# Patient Record
Sex: FEMALE | Race: BLACK OR AFRICAN AMERICAN | Employment: FULL TIME | URBAN - METROPOLITAN AREA
[De-identification: names, ages, dates, MRNs, and addresses within clinical notes are randomized per-mention and may not be internally consistent; named-entity substitution may affect disease eponyms.]

---

## 2018-01-23 ENCOUNTER — APPOINTMENT (OUTPATIENT)
Dept: GENERAL RADIOLOGY | Age: 38
End: 2018-01-23
Attending: PHYSICIAN ASSISTANT
Payer: COMMERCIAL

## 2018-01-23 ENCOUNTER — HOSPITAL ENCOUNTER (EMERGENCY)
Age: 38
Discharge: HOME | End: 2018-01-23
Attending: INTERNAL MEDICINE
Payer: COMMERCIAL

## 2018-01-23 VITALS
WEIGHT: 293 LBS | TEMPERATURE: 98.4 F | SYSTOLIC BLOOD PRESSURE: 134 MMHG | BODY MASS INDEX: 50.02 KG/M2 | HEART RATE: 88 BPM | OXYGEN SATURATION: 100 % | DIASTOLIC BLOOD PRESSURE: 81 MMHG | HEIGHT: 64 IN | RESPIRATION RATE: 20 BRPM

## 2018-01-23 DIAGNOSIS — M77.9 BONE SPUR: ICD-10-CM

## 2018-01-23 DIAGNOSIS — M72.2 PLANTAR FASCIITIS: ICD-10-CM

## 2018-01-23 DIAGNOSIS — M79.671 RIGHT FOOT PAIN: Primary | ICD-10-CM

## 2018-01-23 PROCEDURE — 73630 X-RAY EXAM OF FOOT: CPT

## 2018-01-23 PROCEDURE — 99282 EMERGENCY DEPT VISIT SF MDM: CPT

## 2018-01-23 RX ORDER — IBUPROFEN 600 MG/1
600 TABLET ORAL
Qty: 20 TAB | Refills: 0 | Status: SHIPPED | OUTPATIENT
Start: 2018-01-23

## 2018-01-23 NOTE — ED TRIAGE NOTES
Patient with complaints of pain to the bottom of her right foot and ankle. Patient states that she has had the pain for 2 weeks.

## 2018-01-23 NOTE — ED PROVIDER NOTES
EMERGENCY DEPARTMENT HISTORY AND PHYSICAL EXAM    Date: 1/23/2018  Patient Name: Haim Ibarra    History of Presenting Illness     Chief Complaint   Patient presents with    Foot Pain         History Provided By: Patient    Chief Complaint: ankle pain  Duration: 2 Weeks  Location: right   Quality: Sharp  Modifying Factors: No medication taken for pain  Associated Symptoms: right heel pain    Additional History (Context):   6:39 PM  Haim Ibarra is a 30 y.o. female who presents to the emergency department C/O sharp right ankle pain onset 2 weeks ago. Associated symptoms include right heel pain. No medication taken for pain. Denies recent injury. 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 Pt denies fever any other Sx or complaints. PCP: None      Past History     Past Medical History:  History reviewed. No pertinent past medical history. Past Surgical History:  History reviewed. No pertinent surgical history. Family History:  History reviewed. No pertinent family history. Social History:  Social History   Substance Use Topics    Smoking status: Never Smoker    Smokeless tobacco: Never Used    Alcohol use No       Allergies:  No Known Allergies      Review of Systems   Review of Systems   Constitutional: Negative for fever. Respiratory: Negative for shortness of breath. Cardiovascular: Negative for chest pain. Musculoskeletal: Positive for arthralgias (right ankle pain) and myalgias (right heel pain). All other systems reviewed and are negative. Physical Exam     Vitals:    01/23/18 1814   BP: 134/81   Pulse: 88   Resp: 20   Temp: 98.4 °F (36.9 °C)   SpO2: 100%   Weight: 143.8 kg (317 lb)   Height: 5' 4\" (1.626 m)     Physical Exam   Constitutional: She is oriented to person, place, and time. Obese   Eyes: Conjunctivae and EOM are normal. Pupils are equal, round, and reactive to light. Right eye exhibits no discharge. Left eye exhibits no discharge. No scleral icterus. Neck: Normal range of motion. Neck supple. No JVD present. No tracheal deviation present. Cardiovascular: Normal rate, regular rhythm, normal heart sounds and intact distal pulses. Pulses:       Dorsalis pedis pulses are 2+ on the right side, and 2+ on the left side. Pulmonary/Chest: Effort normal and breath sounds normal.   Abdominal: Soft. Bowel sounds are normal. She exhibits no distension. There is no tenderness. Morbid obese. No HSM   Musculoskeletal: Normal range of motion. Tenderness in right medial malleolus and achilles area     No edema in bilateral LE    No deformity in right foot   Neurological: She is alert and oriented to person, place, and time. She has normal reflexes. No focal motor weakness. Skin: Skin is warm and dry. No rash noted. Psychiatric: She has a normal mood and affect. Her behavior is normal.   Nursing note and vitals reviewed. Diagnostic Study Results     Labs -   No results found for this or any previous visit (from the past 12 hour(s)). Radiologic Studies -   XR FOOT RT MIN 3 V   Final Result    IMPRESSION:     Degenerative changes the foot as described. No acute fractures or subluxation  As read by the radiologist.        7:30 PM  [id number] [facility] X-ray shows no fracture. Bone spur and DJD noted by radiologist   Pending review by Radiologist  Recorded by Junie Shane ED Scribe, as dictated by Juarez Nieves MD    CT Results  (Last 48 hours)    None        CXR Results  (Last 48 hours)    None          Medications given in the ED-  Medications - No data to display      Medical Decision Making   I am the first provider for this patient. I reviewed the vital signs, available nursing notes, past medical history, past surgical history, family history and social history. Vital Signs-Reviewed the patient's vital signs.     Pulse Oximetry Analysis - 100% on RA     Records Reviewed: Nursing Notes    Provider Notes (Medical Decision Making): DDX:fracture, dislocation, strain, sprain, plantar fasciitis. Bone spur     Procedures:  Procedures    ED Course:   6:39 PM Initial assessment performed. The patients presenting problems have been discussed, and they are in agreement with the care plan formulated and outlined with them. I have encouraged them to ask questions as they arise throughout their visit. Diagnosis and Disposition       DISCHARGE NOTE:  7:31 PM  Jessy Sykes's  results have been reviewed with her. She has been counseled regarding her diagnosis, treatment, and plan. She verbally conveys understanding and agreement of the signs, symptoms, diagnosis, treatment and prognosis and additionally agrees to follow up as discussed. She also agrees with the care-plan and conveys that all of her questions have been answered. I have also provided discharge instructions for her that include: educational information regarding their diagnosis and treatment, and list of reasons why they would want to return to the ED prior to their follow-up appointment, should her condition change. She has been provided with education for proper emergency department utilization. Weight bearing as tolerated. Discussed some exercises for stretches and strength to be done once pain better controlled, though not to overdo, may elevated leg above level of heart as much as possible. CLINICAL IMPRESSION:    1. Right foot pain    2. Plantar fasciitis    3. Bone spur        PLAN:  1. D/C Home  2. Current Discharge Medication List      START taking these medications    Details   ibuprofen (MOTRIN) 600 mg tablet Take 1 Tab by mouth every eight (8) hours as needed for Pain. Qty: 20 Tab, Refills: 0           3.    Follow-up Information     Follow up With Details Comments Contact Info    Mainor Fuller MD Schedule an appointment as soon as possible for a visit For follow up with podiatry  Michelle Wong [address] [place] [address] [address] [place] [address] EMERGENCY DEPT Go to As needed, as symptoms worsen [address] [place] [id number]  [phone]        _______________________________    Attestations: This note is prepared by Dacia eJnnings, acting as Scribe for Adrien Baumgarten, MD.    Adrien Baumgarten, MD:  The scribe's documentation has been prepared under my direction and personally reviewed by me in its entirety.   I confirm that the note above accurately reflects all work, treatment, procedures, and medical decision making performed by me.  _______________________________

## 2018-01-24 NOTE — DISCHARGE INSTRUCTIONS
Foot Pain: Care Instructions  Your Care Instructions  Foot injuries that cause pain and swelling are fairly common. Almost all sports or home repair projects can cause a misstep that ends up as foot pain. Normal wear and tear, especially as you get older, also can cause foot pain. Most minor foot injuries will heal on their own, and home treatment is usually all you need to do. If you have a severe injury, you may need tests and treatment. Follow-up care is a key part of your treatment and safety. Be sure to make and go to all appointments, and call your doctor if you are having problems. It's also a good idea to know your test results and keep a list of the medicines you take. How can you care for yourself at home? · Take pain medicines exactly as directed. ¨ If the doctor gave you a prescription medicine for pain, take it as prescribed. ¨ If you are not taking a prescription pain medicine, ask your doctor if you can take an over-the-counter medicine. · Rest and protect your foot. Take a break from any activity that may cause pain. · Put ice or a cold pack on your foot for 10 to 20 minutes at a time. Put a thin cloth between the ice and your skin. · Prop up the sore foot on a pillow when you ice it or anytime you sit or lie down during the next 3 days. Try to keep it above the level of your heart. This will help reduce swelling. · Your doctor may recommend that you wrap your foot with an elastic bandage. Keep your foot wrapped for as long as your doctor advises. · If your doctor recommends crutches, use them as directed. · Wear roomy footwear. · As soon as pain and swelling end, begin gentle exercises of your foot. Your doctor can tell you which exercises will help. When should you call for help? Call 911 anytime you think you may need emergency care. For example, call if:  ? · Your foot turns pale, white, blue, or cold.    ?Call your doctor now or seek immediate medical care if:  ? · You cannot move or stand on your foot. ? · Your foot looks twisted or out of its normal position. ? · Your foot is not stable when you step down. ? · You have signs of infection, such as:  ¨ Increased pain, swelling, warmth, or redness. ¨ Red streaks leading from the sore area. ¨ Pus draining from a place on your foot. ¨ A fever. ? · Your foot is numb or tingly. ? Watch closely for changes in your health, and be sure to contact your doctor if:  ? · You do not get better as expected. ? · You have bruises from an injury that last longer than 2 weeks. Where can you learn more? Go to http://[employer]in[employer] Tradingmoni.info/. Enter F274 in the search box to learn more about \"Foot Pain: Care Instructions. \"  Current as of: March 21, 2017  Content Version: 11.4  © 2049-4654 [employer]. Care instructions adapted under license by [employer] (which disclaims liability or warranty for this information). If you have questions about a medical condition or this instruction, always ask your healthcare professional. [address] any warranty or liability for your use of this information. Plantar Fasciitis: Care Instructions  Your Care Instructions    Plantar fasciitis is pain and inflammation of the plantar fascia, the tissue at the bottom of your foot that connects the heel bone to the toes. The plantar fascia also supports the arch. If you strain the plantar fascia, it can develop small tears and cause heel pain when you stand or walk. Plantar fasciitis can be caused by running or other sports. It also may occur in people who are overweight or who have high arches or flat feet. You may get plantar fasciitis if you walk or stand for long periods, or have a tight Achilles tendon or calf muscles. You can improve your foot pain with rest and other care at home. It might take a few weeks to a few months for your foot to heal completely.   Follow-up care is a key part of your treatment and safety. Be sure to make and go to all appointments, and call your doctor if you are having problems. It's also a good idea to know your test results and keep a list of the medicines you take. How can you care for yourself at home? · Rest your feet often. Reduce your activity to a level that lets you avoid pain. If possible, do not run or walk on hard surfaces. · Take pain medicines exactly as directed. ¨ If the doctor gave you a prescription medicine for pain, take it as prescribed. ¨ If you are not taking a prescription pain medicine, take an over-the-counter anti-inflammatory medicine for pain and swelling, such as ibuprofen (Advil, Motrin) or naproxen (Aleve). Read and follow all instructions on the label. · Use ice massage to help with pain and swelling. You can use an ice cube or an ice cup several times a day. To make an ice cup, fill a paper cup with water and freeze it. Cut off the top of the cup until a half-inch of ice shows. Hold onto the remaining paper to use the cup. Rub the ice in small circles over the area for 5 to 7 minutes. · Contrast baths, which alternate hot and cold water, can also help reduce swelling. But because heat alone may make pain and swelling worse, end a contrast bath with a soak in cold water. · Wear a night splint if your doctor suggests it. A night splint holds your foot with the toes pointed up and the foot and ankle at a 90-degree angle. This position gives the bottom of your foot a constant, gentle stretch. · Do simple exercises such as calf stretches and towel stretches 2 to 3 times each day, especially when you first get up in the morning. These can help the plantar fascia become more flexible. They also make the muscles that support your arch stronger. Hold these stretches for 15 to 30 seconds per stretch. Repeat 2 to 4 times. ¨ Stand about 1 foot from a wall. Place the palms of both hands against the wall at chest level.  Lean forward against the wall, keeping one leg with the knee straight and heel on the ground while bending the knee of the other leg. ¨ Sit down on the floor or a mat with your feet stretched in front of you. Roll up a towel lengthwise, and loop it over the ball of your foot. Holding the towel at both ends, gently pull the towel toward you to stretch your foot. · Wear shoes with good arch support. Athletic shoes or shoes with a well-cushioned sole are good choices. · Try heel cups or shoe inserts (orthotics) to help cushion your heel. You can buy these at many shoe stores. · Put on your shoes as soon as you get out of bed. Going barefoot or wearing slippers may make your pain worse. · Reach and stay at a good weight for your height. This puts less strain on your feet. When should you call for help? Call your doctor now or seek immediate medical care if:  · You have heel pain with fever, redness, or warmth in your heel. · You cannot put weight on the sore foot. Watch closely for changes in your health, and be sure to contact your doctor if:  · You have numbness or tingling in your heel. · Your heel pain lasts more than 2 weeks. Where can you learn more? Go to http://erin-moni.info/. Matty Ryder in the search box to learn more about \"Plantar Fasciitis: Care Instructions. \"  Current as of: March 21, 2017  Content Version: 11.4  © 6934-5282 [employer]. Care instructions adapted under license by [employer] (which disclaims liability or warranty for this information). If you have questions about a medical condition or this instruction, always ask your healthcare professional. [place] [address] any warranty or liability for your use of this information.

## 2018-01-24 NOTE — ED NOTES
See scanned documents for pt signing that she rec'd discharge instructions. Patient armband removed and shredded. Pt given script x1 with discharge instructions and verbalizes understanding. Pt discharged home ambulatory, no distress noted.

## 2022-01-01 ENCOUNTER — HOSPITAL ENCOUNTER (OUTPATIENT)
Dept: LAB | Age: 42
Discharge: HOME | End: 2022-01-01

## 2022-01-01 LAB — SENTARA SPECIMEN COL,SENBCF: NORMAL

## 2022-01-01 PROCEDURE — 99001 SPECIMEN HANDLING PT-LAB: CPT

## 2023-05-12 RX ORDER — IBUPROFEN 600 MG/1
TABLET ORAL EVERY 8 HOURS PRN
COMMUNITY
Start: 2018-01-23

## 2023-05-26 ENCOUNTER — HOSPITAL ENCOUNTER (EMERGENCY)
Facility: HOSPITAL | Age: 43
Discharge: HOME | End: 2023-05-26
Attending: EMERGENCY MEDICINE
Payer: COMMERCIAL

## 2023-05-26 VITALS
BODY MASS INDEX: 50.02 KG/M2 | SYSTOLIC BLOOD PRESSURE: 124 MMHG | OXYGEN SATURATION: 98 % | TEMPERATURE: 98 F | DIASTOLIC BLOOD PRESSURE: 75 MMHG | RESPIRATION RATE: 16 BRPM | WEIGHT: 293 LBS | HEART RATE: 98 BPM | HEIGHT: 64 IN

## 2023-05-26 DIAGNOSIS — J02.9 EXUDATIVE PHARYNGITIS: Primary | ICD-10-CM

## 2023-05-26 PROCEDURE — 6370000000 HC RX 637 (ALT 250 FOR IP): Performed by: EMERGENCY MEDICINE

## 2023-05-26 PROCEDURE — 99283 EMERGENCY DEPT VISIT LOW MDM: CPT

## 2023-05-26 RX ORDER — GUAIFENESIN/DEXTROMETHORPHAN 100-10MG/5
10 SYRUP ORAL 3 TIMES DAILY PRN
Qty: 120 ML | Refills: 0 | Status: SHIPPED | OUTPATIENT
Start: 2023-05-26 | End: 2023-06-05

## 2023-05-26 RX ORDER — PREDNISOLONE SODIUM PHOSPHATE 15 MG/5ML
60 SOLUTION ORAL DAILY
Qty: 80 ML | Refills: 0 | Status: SHIPPED | OUTPATIENT
Start: 2023-05-26 | End: 2023-05-30

## 2023-05-26 RX ORDER — PREDNISOLONE SODIUM PHOSPHATE 15 MG/5ML
60 SOLUTION ORAL DAILY
Status: DISCONTINUED | OUTPATIENT
Start: 2023-05-26 | End: 2023-05-26 | Stop reason: HOSPADM

## 2023-05-26 RX ORDER — AMOXICILLIN 250 MG/5ML
500 POWDER, FOR SUSPENSION ORAL 3 TIMES DAILY
Qty: 300 ML | Refills: 0 | Status: SHIPPED | OUTPATIENT
Start: 2023-05-26 | End: 2023-06-05

## 2023-05-26 RX ADMIN — Medication 60 MG: at 08:26

## 2023-05-26 ASSESSMENT — PAIN SCALES - GENERAL: PAINLEVEL_OUTOF10: 0

## 2023-05-26 ASSESSMENT — PAIN - FUNCTIONAL ASSESSMENT: PAIN_FUNCTIONAL_ASSESSMENT: 0-10

## 2023-05-26 NOTE — ED PROVIDER NOTES
EMERGENCY DEPARTMENT HISTORY AND PHYSICAL EXAM    Date: 5/26/2023  Patient Name: Ethan Garcia    History of Presenting Illness     Chief Complaint   Patient presents with    Pharyngitis    Otalgia         [facility]: [id number]    Additional History (Context):   7:54 AM EDT  Ethan Garcia is a 38 y.o. female with PMHX of *** who presents to the emergency department C/O ***    Social History  ***    Family History  ***    PCP: None None    Current Facility-Administered Medications   Medication Dose Route Frequency Provider Last Rate Last Admin    prednisoLONE (ORAPRED) 15 MG/5ML solution 60 mg  60 mg Oral Daily Sharda Perez MD         Current Outpatient Medications   Medication Sig Dispense Refill    prednisoLONE (ORAPRED) 15 MG/5ML solution Take 20 mLs by mouth daily for 4 days Begin on Saturday 80 mL 0    amoxicillin (AMOXIL) 250 MG/5ML suspension Take 10 mLs by mouth 3 times daily for 10 days 300 mL 0    guaiFENesin-dextromethorphan (ROBITUSSIN DM) 100-10 MG/5ML syrup Take 10 mLs by mouth 3 times daily as needed for Cough 120 mL 0    ibuprofen (ADVIL;MOTRIN) 600 MG tablet Take by mouth every 8 hours as needed         Past History   [id number]  Past Medical History:  No past medical history on file. Past Surgical History:  No past surgical history on file. Family History:  No family history on file. Social History:  Social History     Tobacco Use    Smoking status: Never    Smokeless tobacco: Never   Substance Use Topics    Alcohol use: No       Allergies:  No Known Allergies      Review of Systems   Review of Systems    ***  Physical Exam     Vitals:    05/26/23 0709   BP: 124/75   Pulse: 98   Resp: 16   Temp: 98 °F (36.7 °C)   SpO2: 98%   Weight: (!) 320 lb (145.2 kg)   Height: 5' 4\" (1.626 m)     Physical Exam    ***    Diagnostic Study Results     Labs -  No results found for this or any previous visit (from the past 24 hour(s)).      Radiologic Studies

## 2023-06-02 ENCOUNTER — HOSPITAL ENCOUNTER (EMERGENCY)
Facility: HOSPITAL | Age: 43
Discharge: HOME | End: 2023-06-02
Attending: STUDENT IN AN ORGANIZED HEALTH CARE EDUCATION/TRAINING PROGRAM
Payer: COMMERCIAL

## 2023-06-02 ENCOUNTER — APPOINTMENT (OUTPATIENT)
Facility: HOSPITAL | Age: 43
End: 2023-06-02
Payer: COMMERCIAL

## 2023-06-02 VITALS
DIASTOLIC BLOOD PRESSURE: 86 MMHG | WEIGHT: 293 LBS | TEMPERATURE: 97.8 F | RESPIRATION RATE: 16 BRPM | BODY MASS INDEX: 50.02 KG/M2 | OXYGEN SATURATION: 100 % | HEART RATE: 93 BPM | HEIGHT: 64 IN | SYSTOLIC BLOOD PRESSURE: 121 MMHG

## 2023-06-02 DIAGNOSIS — J02.9 ACUTE PHARYNGITIS, UNSPECIFIED ETIOLOGY: Primary | ICD-10-CM

## 2023-06-02 LAB
FLUAV RNA SPEC QL NAA+PROBE: NOT DETECTED
FLUBV RNA SPEC QL NAA+PROBE: NOT DETECTED
S PYO AG THROAT QL: NEGATIVE
SARS-COV-2 RNA RESP QL NAA+PROBE: NOT DETECTED

## 2023-06-02 PROCEDURE — 87070 CULTURE OTHR SPECIMN AEROBIC: CPT

## 2023-06-02 PROCEDURE — 99284 EMERGENCY DEPT VISIT MOD MDM: CPT

## 2023-06-02 PROCEDURE — 71046 X-RAY EXAM CHEST 2 VIEWS: CPT

## 2023-06-02 PROCEDURE — 87636 SARSCOV2 & INF A&B AMP PRB: CPT

## 2023-06-02 PROCEDURE — 87880 STREP A ASSAY W/OPTIC: CPT

## 2023-06-02 RX ORDER — PREDNISONE 10 MG/1
TABLET ORAL
Qty: 21 TABLET | Refills: 0 | Status: SHIPPED | OUTPATIENT
Start: 2023-06-02

## 2023-06-02 NOTE — ED PROVIDER NOTES
with plan. FINAL IMPRESSION     1. Acute pharyngitis, unspecified etiology            DISPOSITION/PLAN   DISPOSITION Decision To Discharge 06/02/2023 05:25:36 PM           PATIENT REFERRED TO:  Fabian Liu MD  [address] [place] [address] [place] [address] EMERGENCY DEPT  [address] [place] [id number]  [phone]             DISCHARGE MEDICATIONS:     Medication List        START taking these medications      predniSONE 10 MG tablet  Commonly known as: DELTASONE  Days 1-4: 20 mg PO before breakfast, Days 5-8: 15 mg PO before breakfast, Days 9-12: 10 mg PO before breakfast, Days 13-17: 5 mg PO before breakfast, Days 18-19: 2.5 mg per day            ASK your doctor about these medications      amoxicillin 250 MG/5ML suspension  Commonly known as: AMOXIL  Take 10 mLs by mouth 3 times daily for 10 days     guaiFENesin-dextromethorphan 100-10 MG/5ML syrup  Commonly known as: ROBITUSSIN DM  Take 10 mLs by mouth 3 times daily as needed for Cough     ibuprofen 600 MG tablet  Commonly known as: ADVIL;MOTRIN               Where to Get Your Medications        These medications were sent to [address], [address] [place] [address] [phone] - F [phone]  [address] [place] [address], [address] [place] [id number]      Phone: [phone]   predniSONE 10 MG tablet                  I am the Primary Clinician of Record. (Please note that parts of this dictation were completed with voice recognition software. Quite often unanticipated grammatical, syntax, homophones, and other interpretive errors are inadvertently transcribed by the computer software. Please disregards these errors.  Please excuse any errors that have escaped final proofreading.)      Milton Oakley PA-C  06/02/23 0738

## 2023-06-02 NOTE — ED TRIAGE NOTES
Patient ambulatory to ED c/o recent diagnosis with strep throat. States that symptoms has not alleviated.  Pt also states that she wants to ensure she does not have pneumonia

## 2023-06-04 LAB
BACTERIA SPEC CULT: NORMAL
SERVICE CMNT-IMP: NORMAL